# Patient Record
Sex: FEMALE | Race: WHITE
[De-identification: names, ages, dates, MRNs, and addresses within clinical notes are randomized per-mention and may not be internally consistent; named-entity substitution may affect disease eponyms.]

---

## 2019-03-08 ENCOUNTER — HOSPITAL ENCOUNTER (EMERGENCY)
Dept: HOSPITAL 72 - EMR | Age: 39
LOS: 1 days | Discharge: HOME | End: 2019-03-09
Payer: COMMERCIAL

## 2019-03-08 VITALS — BODY MASS INDEX: 19.63 KG/M2 | WEIGHT: 115 LBS | HEIGHT: 64 IN

## 2019-03-08 DIAGNOSIS — F32.9: ICD-10-CM

## 2019-03-08 DIAGNOSIS — R11.2: ICD-10-CM

## 2019-03-08 DIAGNOSIS — F41.9: ICD-10-CM

## 2019-03-08 DIAGNOSIS — R19.7: ICD-10-CM

## 2019-03-08 DIAGNOSIS — R10.31: Primary | ICD-10-CM

## 2019-03-08 LAB
ADD MANUAL DIFF: NO
ANION GAP SERPL CALC-SCNC: 9 MMOL/L (ref 5–15)
APPEARANCE UR: CLEAR
APTT PPP: (no result) S
BASOPHILS NFR BLD AUTO: 2 % (ref 0–2)
BUN SERPL-MCNC: 9 MG/DL (ref 7–18)
CALCIUM SERPL-MCNC: 9 MG/DL (ref 8.5–10.1)
CHLORIDE SERPL-SCNC: 102 MMOL/L (ref 98–107)
CO2 SERPL-SCNC: 27 MMOL/L (ref 21–32)
CREAT SERPL-MCNC: 0.8 MG/DL (ref 0.55–1.3)
EOSINOPHIL NFR BLD AUTO: 0.5 % (ref 0–3)
ERYTHROCYTE [DISTWIDTH] IN BLOOD BY AUTOMATED COUNT: 10.7 % (ref 11.6–14.8)
GLUCOSE UR STRIP-MCNC: NEGATIVE MG/DL
HCT VFR BLD CALC: 41.6 % (ref 37–47)
HGB BLD-MCNC: 14.7 G/DL (ref 12–16)
KETONES UR QL STRIP: NEGATIVE
LEUKOCYTE ESTERASE UR QL STRIP: NEGATIVE
LYMPHOCYTES NFR BLD AUTO: 34.2 % (ref 20–45)
MCV RBC AUTO: 106 FL (ref 80–99)
MONOCYTES NFR BLD AUTO: 6.9 % (ref 1–10)
NEUTROPHILS NFR BLD AUTO: 56.3 % (ref 45–75)
NITRITE UR QL STRIP: NEGATIVE
PH UR STRIP: 5 [PH] (ref 4.5–8)
PLATELET # BLD: 289 K/UL (ref 150–450)
POTASSIUM SERPL-SCNC: 3.2 MMOL/L (ref 3.5–5.1)
PROT UR QL STRIP: NEGATIVE
RBC # BLD AUTO: 3.93 M/UL (ref 4.2–5.4)
SODIUM SERPL-SCNC: 138 MMOL/L (ref 136–145)
SP GR UR STRIP: 1 (ref 1–1.03)
UROBILINOGEN UR-MCNC: NORMAL MG/DL (ref 0–1)
WBC # BLD AUTO: 8.5 K/UL (ref 4.8–10.8)

## 2019-03-08 PROCEDURE — 81025 URINE PREGNANCY TEST: CPT

## 2019-03-08 PROCEDURE — 85025 COMPLETE CBC W/AUTO DIFF WBC: CPT

## 2019-03-08 PROCEDURE — 96374 THER/PROPH/DIAG INJ IV PUSH: CPT

## 2019-03-08 PROCEDURE — 81003 URINALYSIS AUTO W/O SCOPE: CPT

## 2019-03-08 PROCEDURE — 96361 HYDRATE IV INFUSION ADD-ON: CPT

## 2019-03-08 PROCEDURE — 80048 BASIC METABOLIC PNL TOTAL CA: CPT

## 2019-03-08 PROCEDURE — 99284 EMERGENCY DEPT VISIT MOD MDM: CPT

## 2019-03-08 PROCEDURE — 74176 CT ABD & PELVIS W/O CONTRAST: CPT

## 2019-03-08 PROCEDURE — 36415 COLL VENOUS BLD VENIPUNCTURE: CPT

## 2019-03-08 NOTE — NUR
ED Nurse Note:



RECIEVED PT FROM URGENT CARE WITH C/O INTERMITTENT RIGHT LOWER ABDOMINAL PAIN 
FOR PAST 2 DAYS, NOW AT 5/10 AND ACHING, ALSO WITH INTERMITTENT NAUSEA AND 
EMESIS X 2, DENIES DIARRHEA OR FEVERS, OR ANY OTHER COMPLAINTS OR DISCOMFORTS, 
PT ASSISTED WITH GOWNING AND URINE SAMPLE COLLECTED, WILL RESUME CARE AS 
ORDERED AND CONTINUE TO CLOSELY MONITOR.

## 2019-03-08 NOTE — EMERGENCY ROOM REPORT
History of Present Illness


General


Chief Complaint:  Abdominal Pain


Source:  Patient





Present Illness


HPI


Is a 38-year-old female with no respiratory issue.  She presents with chief 

complaint of right flank/right lower quadrant pain.  This been ongoing for last 

2 days.  Yesterday she had multiple episode vomiting.  Better now.  Now with 

diarrhea.  Went to urgent care and was told to come here to rule out 

appendicitis.  Pain is crampy in nature.  Worse with movement.  Mostly right 

flank area.  No dysuria frequency.  Vomiting is nonbloody nonbilious.  Diarrhea 

is watery.  Pain is 7 out of 10.


Allergies:  


Coded Allergies:  


     No Known Allergies (Unverified , 3/8/19)





Patient History


Past Medical History:  see triage record, old chart reviewed


Past Surgical History:  none


Pertinent Family History:  none


Social History:  Denies: smoking


Last Menstrual Period:  feb 29,2019


Pregnant Now:  No


Immunizations:  other


Reviewed Nursing Documentation:  PMH: Agreed; PSxH: Agreed





Nursing Documentation-PMH


Past Medical History:  No Stated History


History Of Psychiatric Problem:  Yes - depression, anxiety





Review of Systems


Eye:  Denies: eye pain, blurred vision


ENT:  Denies: ear pain, nose congestion, throat swelling


Respiratory:  Denies: cough, shortness of breath


Cardiovascular:  Denies: chest pain, palpitations


Gastrointestinal:  Reports: abdominal pain, diarrhea, nausea, vomiting


Musculoskeletal:  Denies: back pain, joint pain


Skin:  Denies: rash


Neurological:  Denies: headache, numbness


Endocrine:  Denies: increased thirst, increased urine


Hematologic/Lymphatic:  Denies: easy bruising


All Other Systems:  negative except mentioned in HPI





Physical Exam





Vital Signs








  Date Time  Temp Pulse Resp B/P (MAP) Pulse Ox O2 Delivery O2 Flow Rate FiO2


 


3/8/19 22:08 98.4 91 16 129/92 96 Room Air  





vitals normal


Sp02 EP Interpretation:  reviewed, normal


General Appearance:  well appearing, no apparent distress, alert


Head:  normocephalic, atraumatic


Eyes:  bilateral eye PERRL, bilateral eye EOMI


ENT:  hearing grossly normal, normal pharynx


Neck:  full range of motion, supple, no meningismus


Respiratory:  chest non-tender, lungs clear, normal breath sounds


Cardiovascular #1:  regular rate, rhythm, no murmur


Gastrointestinal:  normal bowel sounds, non tender, no mass, no organomegaly, 

no bruit, non-distended


Musculoskeletal:  back normal, gait/station normal, normal range of motion


Psychiatric:  mood/affect normal


Skin:  warm/dry





Medical Decision Making


Diagnostic Impression:  


 Primary Impression:  


 Abdominal pain


 Qualified Codes:  R10.84 - Generalized abdominal pain


ER Course


Patient with abdominal pain with nausea vomiting and diarrhea.  This is most 

likely a gastroenteritis.  She felt better now.  No evidence of acute abdomen 

or appendicitis.  We'll discharge home.


Lab Results Impression


labs normal


CT/MRI/US Diagnostic Results


CT/MRI/US Diagnostic Results :  


   Imaging Test Ordered:  CT abdomen and pelvis


   Impression


Read by radiologist.  Negative.





Last Vital Signs








  Date Time  Temp Pulse Resp B/P (MAP) Pulse Ox O2 Delivery O2 Flow Rate FiO2


 


3/8/19 22:08 98.4 91 16 129/92 96 Room Air  








Status:  improved


Disposition:  HOME, SELF-CARE


Condition:  Stable


Patient Instructions:  Abdominal Pain, Adult





Additional Instructions:  


Follow-up with your doctor in 7 days.  Return if symptom worsen.











Gareth Villavicencio MD Mar 8, 2019 22:59

## 2019-03-09 VITALS — SYSTOLIC BLOOD PRESSURE: 124 MMHG | DIASTOLIC BLOOD PRESSURE: 78 MMHG

## 2019-03-09 VITALS — DIASTOLIC BLOOD PRESSURE: 78 MMHG | SYSTOLIC BLOOD PRESSURE: 124 MMHG

## 2019-03-09 NOTE — DIAGNOSTIC IMAGING REPORT
EXAM:

  CT Abdomen and Pelvis Without Intravenous Contrast

 

CLINICAL HISTORY:

  ABD PAIN

 

TECHNIQUE:

  Axial computed tomography images of the abdomen and pelvis without 

intravenous contrast.  CTDI is 0.15, 10.24 mGy and DLP is 510 mGy-cm.  

One or more of the following dose reduction techniques were used: 

automated exposure control, adjustment of the mA and/or kV according to 

patient size, use of iterative reconstruction technique.

  Coronal and sagittal reformatted images were created and reviewed.

 

COMPARISON:

  No relevant prior studies available.

 

FINDINGS:

  Lung bases:  The lung bases are unremarkable.

 

 ABDOMEN:

  Liver:  There are at least 2 small nonspecific low-density lesions in 

the left lobe of the liver.  Evaluation is limited without IV contrast.  

The largest measures approximately 1.6 cm.

  Gallbladder and bile ducts:  The gallbladder is grossly unremarkable 

for a noncontrast CT.  No calcified stones.  No ductal dilation.

  Pancreas:  The pancreas is grossly unremarkable for a noncontrast CT.  

No ductal dilation.

  Spleen:  The spleen is grossly unremarkable for a noncontrast CT.

  Adrenals:  The adrenals are grossly unremarkable for a noncontrast CT.

  Kidneys and ureters:  No evidence for hydronephrosis or perinephric 

stranding.  No evidence for renal or ureteral stone.

  Stomach and bowel:  No evidence for bowel related inflammatory changes. 

 No evidence for significant bowel loop dilation to suggest an 

obstructive process.  

 

 PELVIS:

  Appendix:  The appendix is normal.

  Bladder:  The bladder is grossly unremarkable.  No stones.

  Reproductive:  The uterus is grossly unremarkable.

 

 ABDOMEN and PELVIS:

  Intraperitoneal space:  No free intraperitoneal fluid or free 

intraperitoneal gas.

  Bones/joints:  No acute fracture.  No dislocation.

  Soft tissues:  Unremarkable.

  Vasculature:  The aorta is grossly unremarkable for a noncontrast CT.  

No abdominal aortic aneurysm.

  Lymph nodes:  Unremarkable.  No enlarged lymph nodes.

 

IMPRESSION:     

  No evidence for hydronephrosis or perinephric stranding.  No evidence 

for renal or ureteral stone.

## 2019-03-09 NOTE — NUR
ED Nurse Note:



MEDS GIVEN FOR PAIN EFFECTIVE, PAIN RESOLVED, PT ANXIOUS TO GO HOME, PT GIVEN 
F/U INFO AND AFTER CARE INSTRUCTIONS AND RE-VERBALIZES S/S TO CONTINUE TO 
MONITOR FOR, PT IS AMBULAOTRY, NO PAIN, SOB OR LABORED BREATHING, IV LINE AND 
ARMBAND REMOVED, NAD NOTED DURING D/C TO HOME.